# Patient Record
Sex: MALE | Race: WHITE
[De-identification: names, ages, dates, MRNs, and addresses within clinical notes are randomized per-mention and may not be internally consistent; named-entity substitution may affect disease eponyms.]

---

## 2018-01-23 ENCOUNTER — HOSPITAL ENCOUNTER (OUTPATIENT)
Dept: HOSPITAL 63 - PMG | Age: 48
Discharge: HOME | End: 2018-01-23
Attending: PHYSICIAN ASSISTANT
Payer: COMMERCIAL

## 2018-01-23 DIAGNOSIS — I86.1: Primary | ICD-10-CM

## 2018-01-23 PROCEDURE — 76870 US EXAM SCROTUM: CPT

## 2018-01-23 NOTE — RAD
Ultrasound testicular/scrotum 1/23/2018



Clinical indication: Right scrotal mass and swelling.



Comparison: None.



Findings:



Right testis measures 3.3 x 4.4 x 2.3 cm with homogeneous echotexture and

intratesticular blood flow identified.



There is a small right varicocele.



The visualized right epididymis is unremarkable.



The left testis measures 4.4 x 3.0 x 2.5 cm with homogeneous echotexture and

intratesticular blood flow identified.



There is a trace left hydrocele.



The left epididymis is unremarkable.



Impression:

1.  Small right-sided varicocele.  Unilateral right varicocele, may be

primary, though CT abdomen and pelvis is recommended to exclude secondary

varicocele due to obstructive mass.

2.  No intratesticular mass identified.

## 2021-04-19 ENCOUNTER — HOSPITAL ENCOUNTER (OUTPATIENT)
Dept: HOSPITAL 63 - RAD | Age: 51
End: 2021-04-19
Payer: COMMERCIAL

## 2021-04-19 DIAGNOSIS — M19.022: Primary | ICD-10-CM

## 2021-04-19 PROCEDURE — 73080 X-RAY EXAM OF ELBOW: CPT
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
